# Patient Record
Sex: MALE | Race: WHITE | NOT HISPANIC OR LATINO | Employment: OTHER | URBAN - METROPOLITAN AREA
[De-identification: names, ages, dates, MRNs, and addresses within clinical notes are randomized per-mention and may not be internally consistent; named-entity substitution may affect disease eponyms.]

---

## 2017-01-19 ENCOUNTER — SLEEP CENTER VISIT (OUTPATIENT)
Dept: SLEEP MEDICINE | Facility: MEDICAL CENTER | Age: 62
End: 2017-01-19
Payer: COMMERCIAL

## 2017-01-19 VITALS
DIASTOLIC BLOOD PRESSURE: 80 MMHG | HEIGHT: 70 IN | HEART RATE: 95 BPM | WEIGHT: 175 LBS | TEMPERATURE: 97.9 F | OXYGEN SATURATION: 85 % | BODY MASS INDEX: 25.05 KG/M2 | SYSTOLIC BLOOD PRESSURE: 130 MMHG | RESPIRATION RATE: 16 BRPM

## 2017-01-19 DIAGNOSIS — J44.9 CHRONIC OBSTRUCTIVE PULMONARY DISEASE, UNSPECIFIED COPD TYPE (HCC): ICD-10-CM

## 2017-01-19 DIAGNOSIS — G47.31 CENTRAL SLEEP APNEA: ICD-10-CM

## 2017-01-19 PROCEDURE — 99213 OFFICE O/P EST LOW 20 MIN: CPT | Performed by: NURSE PRACTITIONER

## 2017-01-19 NOTE — MR AVS SNAPSHOT
"        Marcel Schultz   2017 3:20 PM   Sleep Center Visit   MRN: [id number]    Department:  [facility]   Dept Phone:  [phone]    Description:  Male : 1955   Provider:  LEONARD Osullivan           Reason for Visit     Apnea Compliance / Send to [facility]      Allergies as of 2017     Allergen Noted Reactions    Penicillins 2016         You were diagnosed with     Central sleep apnea   [411460]       Chronic obstructive pulmonary disease, unspecified COPD type (CMS-HCC)   [6087235]         Vital Signs     Blood Pressure Pulse Temperature Respirations Height Weight    130/80 mmHg 95 36.6 °C (97.9 °F) 16 1.778 m (5' 10\") 79.379 kg (175 lb)    Body Mass Index Oxygen Saturation Smoking Status             25.11 kg/m2 85% Former Smoker         Basic Information     Date Of Birth Sex Race Ethnicity Preferred Language    1955 Male White Non- English      Your appointments     2017  1:00 PM   Pulmonary Function Test with PFT-RM2   [facility] (--)    [address]  [address]  [place] [id number] [phone]            2017  2:00 PM   Established Patient Pul with YVONNE Davies   [facility] (--)    [address]  [address]  [place] [id number] [phone]            Mar 30, 2017  1:00 PM   Follow UP with Carmelo Davis M.D.   [facility] Sleep [facility] (--)    [address] [place] [address] A  [place] [id number] [phone]              Problem List              ICD-10-CM Priority Class Noted - Resolved    Central sleep apnea G47.31   2017 - Present    COPD (chronic obstructive pulmonary disease) (CMS-HCC) J44.9   2017 - Present      Health Maintenance        Date Due Completion Dates    IMM DTaP/Tdap/Td Vaccine (1 - Tdap) 2/15/1974 ---    COLONOSCOPY 2/15/2005 ---    IMM ZOSTER VACCINE 2/15/2015 ---    IMM INFLUENZA (1) 2016            Current Immunizations    "    Influenza TIV (IM) 1/1/2016    Pneumococcal polysaccharide vaccine (PPSV-23) 1/1/2016      Below and/or attached are the medications your provider expects you to take. Review all of your home medications and newly ordered medications with your provider and/or pharmacist. Follow medication instructions as directed by your provider and/or pharmacist. Please keep your medication list with you and share with your provider. Update the information when medications are discontinued, doses are changed, or new medications (including over-the-counter products) are added; and carry medication information at all times in the event of emergency situations     Allergies:  PENICILLINS - (reactions not documented)               Medications  Valid as of: January 19, 2017 -  3:54 PM    Generic Name Brand Name Tablet Size Instructions for use    Albuterol Sulfate (Aero Soln) albuterol 108 (90 BASE) MCG/ACT Inhale 2 Puffs by mouth every four hours as needed for Shortness of Breath.        Ascorbic Acid (Tab) Vitamin C 100 MG Take  by mouth.        Aspirin (Tab) aspirin 81 MG Take 81 mg by mouth every day.        Cholecalciferol (Tab) vitamin D3 (cholecalciferol) 1000 UNIT Take 1,000 Units by mouth every day.        Colesevelam HCl (Tab) WELCHOL 625 MG Take 1,875 mg by mouth 2 times a day, with meals.        Colestipol HCl (Tab) COLESTID 1 GM Take 2 g by mouth 2 Times a Day.        Doxepin HCl (Cap) SINEQUAN 150 MG Take 150 mg by mouth every evening.        Fenofibrate (Tab) TRIGLIDE 160 MG Take 160 mg by mouth every day.        FentaNYL (PATCH 72 HR) DURAGESIC 100 MCG/HR Apply 1 Patch to skin as directed every 72 hours.        FentaNYL (PATCH 72 HR) DURAGESIC 75 MCG/HR APPLY 1 PATCH EVERY 24 HOURS        Gabapentin (Cap) NEURONTIN 300 MG TAKE 1 ORAL CAPSULE 3 TIMES A DAY        Glucosamine HCl (Tab) Glucosamine HCl 1500 MG Take  by mouth.        Mirtazapine (Tab) REMERON 15 MG Take 15 mg by mouth every evening.        Multiple  Vitamin   Take  by mouth.        Omeprazole (CAPSULE DELAYED RELEASE) PRILOSEC 20 MG Take 20 mg by mouth every day.        PARoxetine HCl (Tab) PAXIL 40 MG Take 40 mg by mouth every day.        Polyethylene Glycol 3350   Take  by mouth.        Tiotropium Bromide Monohydrate (Cap) SPIRIVA 18 MCG Inhale 18 mcg by mouth every day.        Tiotropium Bromide Monohydrate (Aero Soln) SPIRIVA RESPIMAT 2.5 MCG/ACT TAKE 2 PUFFS BY MOUTH EVERY DAY        Zolpidem Tartrate (Tab) AMBIEN 5 MG Take 1 Tab by mouth at bedtime as needed for Sleep (1 to 3 po qhs prn insomnia/sleep study. Bring to sleep study.).        .                 Medicines prescribed today were sent to:     [facility]/PHARMACY #[id number] - [place], [place] - [address]    [address] [facility] [address]    Phone: [phone] Fax: [phone]    Open 24 Hours?: No    DME [facility]    [address] [place] [address]    Phone: [phone] Fax: [phone]      Medication refill instructions:       If your prescription bottle indicates you have medication refills left, it is not necessary to call your provider’s office. Please contact your pharmacy and they will refill your medication.    If your prescription bottle indicates you do not have any refills left, you may request refills at any time through one of the following ways: The online [employer] system (except Urgent Care), by calling your provider’s office, or by asking your pharmacy to contact your provider’s office with a refill request. Medication refills are processed only during regular business hours and may not be available until the next business day. Your provider may request additional information or to have a follow-up visit with you prior to refilling your medication.   *Please Note: Medication refills are assigned a new Rx number when refilled electronically. Your pharmacy may indicate that no refills were authorized even though a new prescription for the same medication is available at the  pharmacy. Please request the medicine by name with the pharmacy before contacting your provider for a refill.        Instructions    1. Continue Spiriva daily and albuterol HFA as needed.  2. Continue ASV 9/3/15 with 4 L of oxygen bleed in.  3. Keep oxygen saturations between 90-94 percent.               [employer] Access Code: Activation code not generated  Current [employer] Status: Active

## 2017-01-19 NOTE — PATIENT INSTRUCTIONS
1. Continue Spiriva daily and albuterol HFA as needed.  2. Continue ASV 9/3/15 with 4 L of oxygen bleed in.  3. Keep oxygen saturations between 90-94 percent.

## 2017-01-19 NOTE — PROGRESS NOTES
Chief Complaint   Patient presents with   • Apnea     Compliance / Send to [facility]:  This is a 61 y.o. male with a history of chronic obstructive pulmonary disease, abnormal CT scan, and central sleep apnea. Pulmonary function tests from 7/29/16 indicate FVC 91% predicted, FEV1 2.3 L, 65% predicted with positive bronchodilator response, QRW520/FVC 54%, FEF 25-75% 30% predicted, % predicted, DLCO 84% predicted. The patient is compliant with Spiriva once daily and Proaiir as needed. Also one antitrypsin level was normal at 14.8 µmol and phenotype with PiSZ. The patient is a history using oxygen at 4 L/m 24 7. He is 85% on room air. He reports he would rather die from a stroke or heart attack then use oxygen. Ultimately being off of his Duragesic patches and think he requires the 4 L he was previously using. He is going to continue monitoring oxygen keep saturations between 90-94 percent.    High resolution CT scan from 6/8/16 indicates some small areas of localized linear and ill-defined interstitial opacifications identified in the lateral left lower lobe, lateral left upper lobe, and posterior right lower lobe. These findings could indicate postinflammatory scarring or fibrosis area additionally bronchiectasis was noted bilaterally and there is no CT evidence of diffuse interstitial lung disease. Emphysema was identified most prominent in the upper lobes. Calcific atherosclerosis was also noted.    Polysomnogram indicates AHI 67.1 and minimum saturation 72%. The patient is compliant with ASV in 9/3/15 with 4 L of oxygen bleed in. Compliance dated 12/20-1/18/78 indicates 100% compliance for 8 hours 3 minutes. AHI is been reduced to 10.0. Patient feels he is more rested. He is benefiting from therapy. He will continue to benefit from therapy. Patient's AHI on histogram has improved tremendously over the last 10 days. In the last 10 days he has come off of 300 µg fentanyl patches and is now on a new  "medication which is helping with lowering oxygen needs and improving AHI.    Past Medical History   Diagnosis Date   • COPD (chronic obstructive pulmonary disease) (CMS-[facility])    • Asthma    • Depression    • Sleep apnea    • Central sleep apnea 1/19/2017     AHI 67.1, minimum saturation 72%, on ASV 9/30/15 with 4 L of oxygen bleed in.       Past Surgical History   Procedure Laterality Date   • Cholecystectomy     • Hernia repair         Social History   Substance Use Topics   • Smoking status: Former Smoker -- 1.50 packs/day for 40 years     Types: Cigarettes     Quit date: 01/01/2013   • Smokeless tobacco: Never Used      Comment: Vape    • Alcohol Use: No      Comment: have not drank in 13 yrs       ROS:   Constitutional: Denies fevers, chills, sweats, fatigue, and weight loss.  Eyes: Denies glasses.  Ears/nose/mouth/throat: Denies injury.  Cardiovascular: Denies chest pain, tightness.  Respiratory: See history of present illness.  GI: Denies heartburn, difficulty swallowing, nausea, and vomiting.  Neurological: Denies frequent headaches, dizziness, weakness.    Vitals:  Filed Vitals:    01/19/17 1528   Height: 1.778 m (5' 10\")   Weight: 79.379 kg (175 lb)   Weight % change since last entry.: 0 %   BP: 130/80   Pulse: 95   BMI (Calculated): 25.11   Resp: 16   Temp: 36.6 °C (97.9 °F)   O2 sat % room air: 85 %       Allergies:  Penicillins    Medications:  Current Outpatient Prescriptions   Medication Sig Dispense Refill   • fentanyl (DURAGESIC) 75 MCG/HR PATCH 72 HR APPLY 1 PATCH EVERY 24 HOURS  0   • gabapentin (NEURONTIN) 300 MG Cap TAKE 1 ORAL CAPSULE 3 TIMES A DAY  2   • SPIRIVA RESPIMAT 2.5 MCG/ACT Aero Soln TAKE 2 PUFFS BY MOUTH EVERY DAY  0   • colestipol (COLESTID) 1 GM Tab Take 2 g by mouth 2 Times a Day.  5   • albuterol 108 (90 BASE) MCG/ACT Aero Soln inhalation aerosol Inhale 2 Puffs by mouth every four hours as needed for Shortness of Breath.     • zolpidem (AMBIEN) 5 MG Tab Take 1 Tab by mouth at " bedtime as needed for Sleep (1 to 3 po qhs prn insomnia/sleep study. Bring to sleep study.). 3 Tab 0   • fenofibrate (TRIGLIDE) 160 MG tablet Take 160 mg by mouth every day.     • paroxetine (PAXIL) 40 MG tablet Take 40 mg by mouth every day.     • tiotropium (SPIRIVA) 18 MCG Cap Inhale 18 mcg by mouth every day.     • colesevelam (WELCHOL) 625 MG Tab Take 1,875 mg by mouth 2 times a day, with meals.     • fentanyl (DURAGESIC) 100 MCG/HR PATCH 72 HR Apply 1 Patch to skin as directed every 72 hours.     • omeprazole (PRILOSEC) 20 MG delayed-release capsule Take 20 mg by mouth every day.     • vitamin D3, cholecalciferol, 1000 UNIT Tab Take 1,000 Units by mouth every day.     • Multiple Vitamin (MULTI VITAMIN MENS PO) Take  by mouth.     • Ascorbic Acid (VITAMIN C) 100 MG Tab Take  by mouth.     • Glucosamine HCl 1500 MG Tab Take  by mouth.     • POLYETHYLENE GLYCOL 3350 PO Take  by mouth.     • aspirin 81 MG tablet Take 81 mg by mouth every day.     • doxepin (SINEQUAN) 150 MG capsule Take 150 mg by mouth every evening.     • mirtazapine (REMERON) 15 MG Tab Take 15 mg by mouth every evening.       No current facility-administered medications for this visit.       PHYSICAL EXAM:  Appearance: Well-developed, well-nourished, no acute distress.  Eyes. PERRL.  Hearing: Grossly intact.  Oropharynx: Tongue normal, posterior pharynx without erythema or exudate.  Respiratory effort: No intercostal retractions or use of accessory muscles.  Lung auscultation: No crackles, wheezing.  Heart auscultation: No murmur, gallop, or rub. Regular rate and rhythm.  Extremities: No cyanosis or edema.  Gait and Station: Normal  Orientation: Oriented to time, place, and person.    Assessment:  1. Central sleep apnea     2. Chronic obstructive pulmonary disease, unspecified COPD type (CMS-HCC)           Plan:  1. Continue Spiriva daily and albuterol HFA as needed.  2. Continue ASV 9/3/15 with 4 L of oxygen bleed in.  3. Keep oxygen  saturations between 90-94 percent.  4. Will not change ASV pressures at this time as AHI is improved tremendously on histogram. We'll evaluate his next visit.  Patient is already scheduled for follow-up with pulmonary function tests. Patient will require CT in the future per Dr. Booth.

## 2017-02-01 ENCOUNTER — OFFICE VISIT (OUTPATIENT)
Dept: PULMONOLOGY | Facility: HOSPICE | Age: 62
End: 2017-02-01
Payer: COMMERCIAL

## 2017-02-01 ENCOUNTER — NON-PROVIDER VISIT (OUTPATIENT)
Dept: PULMONOLOGY | Facility: HOSPICE | Age: 62
End: 2017-02-01
Payer: COMMERCIAL

## 2017-02-01 VITALS
BODY MASS INDEX: 25.77 KG/M2 | TEMPERATURE: 99.3 F | OXYGEN SATURATION: 91 % | HEART RATE: 82 BPM | SYSTOLIC BLOOD PRESSURE: 132 MMHG | HEIGHT: 70 IN | RESPIRATION RATE: 16 BRPM | WEIGHT: 180 LBS | DIASTOLIC BLOOD PRESSURE: 86 MMHG

## 2017-02-01 DIAGNOSIS — J43.9 PULMONARY EMPHYSEMA, UNSPECIFIED EMPHYSEMA TYPE (HCC): ICD-10-CM

## 2017-02-01 DIAGNOSIS — J44.9 CHRONIC OBSTRUCTIVE PULMONARY DISEASE, UNSPECIFIED COPD TYPE (HCC): ICD-10-CM

## 2017-02-01 DIAGNOSIS — G47.31 CENTRAL SLEEP APNEA: ICD-10-CM

## 2017-02-01 PROCEDURE — 90670 PCV13 VACCINE IM: CPT | Performed by: NURSE PRACTITIONER

## 2017-02-01 PROCEDURE — 99214 OFFICE O/P EST MOD 30 MIN: CPT | Mod: 25 | Performed by: NURSE PRACTITIONER

## 2017-02-01 PROCEDURE — 90471 IMMUNIZATION ADMIN: CPT | Performed by: NURSE PRACTITIONER

## 2017-02-01 RX ORDER — CHLORHEXIDINE GLUCONATE ORAL RINSE 1.2 MG/ML
SOLUTION DENTAL
Refills: 0 | COMMUNITY
Start: 2017-01-20 | End: 2017-08-09

## 2017-02-01 RX ORDER — CLINDAMYCIN HYDROCHLORIDE 300 MG/1
CAPSULE ORAL
Refills: 0 | COMMUNITY
Start: 2017-01-20 | End: 2017-08-09

## 2017-02-01 RX ORDER — GABAPENTIN 600 MG/1
600 TABLET ORAL
Refills: 0 | COMMUNITY
Start: 2017-01-24 | End: 2017-08-09

## 2017-02-01 RX ORDER — BUPRENORPHINE AND NALOXONE 2; .5 MG/1; MG/1
FILM, SOLUBLE BUCCAL; SUBLINGUAL
COMMUNITY

## 2017-02-01 ASSESSMENT — PULMONARY FUNCTION TESTS
FEV1_PERCENT_CHANGE: 5
FVC_PERCENT_PREDICTED: 104
FEV1: 2.43
FEV1/FVC_PERCENT_PREDICTED: 70
FEV1_PERCENT_CHANGE: 11
FVC_PERCENT_PREDICTED: 94
FVC: 4.34
FEV1/FVC: 53
FEV1_PERCENT_PREDICTED: 66
FEV1_PERCENT_PREDICTED: 69
FVC_PREDICTED: 4.62
FEV1/FVC: 50.1
FEV1_PREDICTED: 3.48
FVC: 4.85
FEV1: 2.31
FEV1/FVC_PERCENT_CHANGE: 45
FEV1/FVC_PERCENT_PREDICTED: 66
FEV1/FVC_PERCENT_PREDICTED: 75

## 2017-02-01 NOTE — PATIENT INSTRUCTIONS
1) Continue ASV machine 9/3/15 with 4L   2) Continue cleaning supplies weekly and change them as insurance allows.   3) Repeat PFT and Cat scan in 6 months - Dec 2016 - will reconsider alpha 1 treatment if abrupt changes in PFT and symptoms.  4) Vaccines: Up to date with Pneumococcal 23 in 2016. Prevnar 13 and flu today  5) Continue Spiriva and Rescue inhaler  6) Continueusing oxygen 24/7 at 4L  7) Return in about 6 months (around 8/1/2017) for pulmonary function results, review of symptoms, if not sooner, follow up with GRADY Castillo, CAT scan results.

## 2017-02-01 NOTE — PROGRESS NOTES
CC:  Here for f/u sleep and pulmonary issues as listed below    HPI:   Mr. Schultz returns today for DEREJE, COPD. He was previously treated by Dr. Chino with Servo adaptive BiPAP ventilation along with oxygen at 2 L/m, but was not able to tolerate the BIPAP. A repeat sleep study performed found severe DEREJE with an AHI of 67.1, a low saturation of 72.0%. 79.5 percent of the events were central apneas. The patient then underwent a CPAP and BiPAP titration both of which were ineffective secondary to the persistence of predominantly central events. Thus a dedicated titration for ASV study was completed and successful with recommendations of a ASV machine of 9/3/15 with 4 L of oxygen. Compliance is unavailable today but reports he wears the machine every single night for approximately 7 hours. He left his machine. He sleeps better overall. He does not have nightmares anymore. He wakes up refreshed without morning headache.  less tired throughout the day.      Patient smoked 2 packages of cigarettes a day for 30 years and quit cigarettes in 2012.  He is on Spiriva once a day and has an albuterol metered-dose inhaler that he uses rarely. He reports his breathing is stable. Denies cough, dyspnea, wheeze, chest pain, fevers and chills, hemoptysis, acid reflux. Alpha-1 antitrypsin serum concentration of 14.8 µmol.  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 Patient's main concern was starting alpha 1 treatment, given he is borderline. At previous office visit Dr. Peñaloza discussed with patient that given his current respiratory status, evidence dose not prove beneficial at this time. We would reconsider if the PFTs becomes significantly worse.  Patient was with understanding. PFTs from 2/2017 are stable and improved from 7/2016 and showed an FEV1 of 2.31 L or 66% with a positive bronchodilator response, Fev1/FVC ratio of 53. The DLCO has dropped to 72 from 84%. We will repeat these in another 6 months.       In addition patient had a CT of his  "chest on 6/8/2016 which was a high-resolution CT scan without contrast. That showed \"some small areas of localized linear and ill-defined interstitial opacifications identified in the lateral left lower lobe, lateral left upper lobe, and posterior right lower lobe. These findings could indicate postinflammatory scarring or fibrosis area additionally bronchiectasis was noted bilaterally and there is no CT evidence of diffuse interstitial lung disease. Emphysema was identified most prominent in the upper lobes. Calcific atherosclerosis was also noted. Patient did not complete his updated CAT scan but understands he needs completed.        Patient Active Problem List    Diagnosis Date Noted   • Central sleep apnea 01/19/2017   • COPD (chronic obstructive pulmonary disease) (CMS-HCC) 01/19/2017       Past Medical History   Diagnosis Date   • COPD (chronic obstructive pulmonary disease) (CMS-HCC)    • Asthma    • Depression    • Sleep apnea    • Central sleep apnea 1/19/2017     AHI 67.1, minimum saturation 72%, on ASV 9/30/15 with 4 L of oxygen bleed in.       Past Surgical History   Procedure Laterality Date   • Cholecystectomy     • Hernia repair         Family History   Problem Relation Age of Onset   • Alzheimer's Disease Father        Social History     Social History   • Marital Status: Single     Spouse Name: N/A   • Number of Children: N/A   • Years of Education: N/A     Occupational History   • Not on file.     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  0   • Buprenorphine HCl-Naloxone HCl (SUBOXONE) 2-0.5 MG FILM Place  under tongue.     • gabapentin (NEURONTIN) 300 MG Cap TAKE 1 ORAL CAPSULE 3 TIMES A DAY  2   • SPIRIVA RESPIMAT 2.5 MCG/ACT Aero Soln TAKE 2 PUFFS BY MOUTH EVERY DAY  0   • colestipol (COLESTID) 1 GM Tab Take 2 g by mouth 2 Times a Day.  5   • albuterol 108 (90 BASE) MCG/ACT Aero Soln inhalation aerosol Inhale 2 Puffs by mouth every four hours as needed for Shortness of Breath.     • fenofibrate (TRIGLIDE) 160 MG tablet Take 160 mg by mouth every day.     • paroxetine (PAXIL) 40 MG tablet Take 40 mg by mouth every day.     • colesevelam (WELCHOL) 625 MG Tab Take 1,875 mg by mouth 2 times a day, with meals.     • vitamin D3, cholecalciferol, 1000 UNIT Tab Take 1,000 Units by mouth every day.     • Multiple Vitamin (MULTI VITAMIN MENS PO) Take  by mouth.     • Ascorbic Acid (VITAMIN C) 100 MG Tab Take  by mouth.     • Glucosamine HCl 1500 MG Tab Take  by mouth.     • POLYETHYLENE GLYCOL 3350 PO Take  by mouth.     • aspirin 81 MG tablet Take 81 mg by mouth every day.     • doxepin (SINEQUAN) 150 MG capsule Take 150 mg by mouth every evening.     • mirtazapine (REMERON) 15 MG Tab Take 15 mg by mouth every evening.     • clindamycin (CLEOCIN) 300 MG Cap TAKE 1 CAPSULE BY MOUTH EVERY 6 HOURS FOR 7 DAYS  0   • fentanyl (DURAGESIC) 75 MCG/HR PATCH 72 HR APPLY 1 PATCH EVERY 24 HOURS  0   • tiotropium (SPIRIVA) 18 MCG Cap Inhale 18 mcg by mouth every day.     • fentanyl (DURAGESIC) 100 MCG/HR PATCH 72 HR Apply 1 Patch to skin as directed every 72 hours.     • omeprazole (PRILOSEC) 20 MG delayed-release capsule Take 20 mg by mouth every day.       No current facility-administered medications for this visit.          Allergies: Penicillins      ROS   Gen: Denies fever, chills, unintentional weight loss, fatigue, night  "sweats  E/N/T: Denies ear pain, nasal congestion  Resp:Denies Dyspnea, wheezing, cough, sputum production, hemoptysis  CV: Denies chest pain, chest tightness, palpitations, BLE edema  Sleep:Denies morning headache, insomnia, daytime somnolence, snoring, gasping for air, apnea  Neuro: Denies frequent headaches, weakness, dizziness  GI: Denies N/V, acid reflux/heartburn  See HPI.  All other systems reviewed and negative      Vital signs for this encounter:  Filed Vitals:    02/01/17 1403   Height: 1.778 m (5' 10\")   Weight: 81.647 kg (180 lb)   Weight % change since last entry.: 0 %   BP: 132/86   Pulse: 82   BMI (Calculated): 25.83   Resp: 16   Temp: 37.4 °C (99.3 °F)                 Physical Exam:   Appearance: well developed, well nourished, no acute distress.  Eyes: PERRL, EOM intact, sclere white, conjunctiva moist.  Ears: no lesions or deformities.  Hearing: grossly intact.  Nose: no lesions or deformities.  Dentition: good dentition.  Oropharynx: tongue normal, posterior pharynx without erythema or exudate.  Neck: supple, trachea midline, no masses.  Respiratory effort: no intercostal retractions or use of accessory muscles.  Lung auscultation: Bilateral diminished   Heart auscultation: no murmur, rub, or gallop.   Extremities: no cyanosis or edema.  Abdomen: soft, non-tender, no masses.  Gait and station: grossly normal   Digits and Nails: no clubbing, cyanosis, petechiae, or nodes.  Cranial nerves: grossly normal.  Motor: no focal deficits observed.  Skin: no rashes, lesions, or ulcers noted.  Orientation: oriented to time, place, and person.  Mood and affect: mood and affect appropriate, normal interaction with examiner.    Assessment   1. Chronic obstructive pulmonary disease, unspecified COPD type (CMS-HCC)  PNEUMOCOCCAL CONJUGATE VACCINE 13-VALENT    INFLUENZA VACCINE QUAD INJECTION >3Y(PRESERVED)    AMB PULMONARY FUNCTION TEST/LAB   2. Central sleep apnea         PLAN:   Patient Instructions   1) " Continue ASV machine 9/3/15 with 4L   2) Continue cleaning supplies weekly and change them as insurance allows.   3) Repeat PFT and Cat scan in 6 months - Dec 2016 - will reconsider alpha 1 treatment if abrupt changes in PFT and symptoms.  4) Vaccines: Up to date with Pneumococcal 23 in 2016. Prevnar 13 and flu today  5) Continue Spiriva and Rescue inhaler  6) Continueusing oxygen 24/7 at 4L  7) No Follow-up on file.  Return in about 6 months (around 8/1/2017) for pulmonary function results, review of symptoms, if not sooner, follow up with GRADY Castillo, CAT scan results.

## 2017-02-01 NOTE — MR AVS SNAPSHOT
Marcel Schultz   2017 1:00 PM   Appointment   MRN: [id number]    Department:  [facility]   Dept Phone:  [phone]    Description:  Male : 1955   Provider:  PFT-RMRonald           Allergies as of 2017     Allergen Noted Reactions    Penicillins 2016         Vital Signs     Smoking Status                   Former Smoker           Basic Information     Date Of Birth Sex Race Ethnicity Preferred Language    1955 Male White Non- English      Your appointments     Mar 30, 2017  1:00 PM   Follow UP with Carmelo Davis M.D.   [facility] Sleep Medicine (--)    [address]  [place] [id number] [phone]              Problem List              ICD-10-CM Priority Class Noted - Resolved    Central sleep apnea G47.31   2017 - Present    COPD (chronic obstructive pulmonary disease) (CMS-HCC) J44.9   2017 - Present      Health Maintenance        Date Due Completion Dates    IMM DTaP/Tdap/Td Vaccine (1 - Tdap) 2/15/1974 ---    COLONOSCOPY 2/15/2005 ---    IMM ZOSTER VACCINE 2/15/2015 ---    IMM INFLUENZA (1) 2016            Current Immunizations     Influenza TIV (IM) 2016    Pneumococcal polysaccharide vaccine (PPSV-23) 2016      Below and/or attached are the medications your provider expects you to take. Review all of your home medications and newly ordered medications with your provider and/or pharmacist. Follow medication instructions as directed by your provider and/or pharmacist. Please keep your medication list with you and share with your provider. Update the information when medications are discontinued, doses are changed, or new medications (including over-the-counter products) are added; and carry medication information at all times in the event of emergency situations     Allergies:  PENICILLINS - (reactions not documented)               Medications  Valid as of: 2017 -  2:11 PM    Generic Name  Brand Name Tablet Size Instructions for use    Albuterol Sulfate (Aero Soln) albuterol 108 (90 BASE) MCG/ACT Inhale 2 Puffs by mouth every four hours as needed for Shortness of Breath.        Ascorbic Acid (Tab) Vitamin C 100 MG Take  by mouth.        Aspirin (Tab) aspirin 81 MG Take 81 mg by mouth every day.        Buprenorphine HCl-Naloxone HCl (FILM) Buprenorphine HCl-Naloxone HCl 2-0.5 MG Place  under tongue.        Chlorhexidine Gluconate (Solution) PERIDEX 0.12 % SWISH AND SPIT BY MOUTH FOR 60 SECONDS 3 TIMES DAILY FOR 2 WEEKS        Cholecalciferol (Tab) vitamin D3 (cholecalciferol) 1000 UNIT Take 1,000 Units by mouth every day.        Clindamycin HCl (Cap) CLEOCIN 300 MG TAKE 1 CAPSULE BY MOUTH EVERY 6 HOURS FOR 7 DAYS        Colesevelam HCl (Tab) WELCHOL 625 MG Take 1,875 mg by mouth 2 times a day, with meals.        Colestipol HCl (Tab) COLESTID 1 GM Take 2 g by mouth 2 Times a Day.        Doxepin HCl (Cap) SINEQUAN 150 MG Take 150 mg by mouth every evening.        Fenofibrate (Tab) TRIGLIDE 160 MG Take 160 mg by mouth every day.        FentaNYL (PATCH 72 HR) DURAGESIC 100 MCG/HR Apply 1 Patch to skin as directed every 72 hours.        FentaNYL (PATCH 72 HR) DURAGESIC 75 MCG/HR APPLY 1 PATCH EVERY 24 HOURS        Gabapentin (Cap) NEURONTIN 300 MG TAKE 1 ORAL CAPSULE 3 TIMES A DAY        Gabapentin (Tab) NEURONTIN 600 MG Take 600 mg by mouth.        Glucosamine HCl (Tab) Glucosamine HCl 1500 MG Take  by mouth.        Mirtazapine (Tab) REMERON 15 MG Take 15 mg by mouth every evening.        Multiple Vitamin   Take  by mouth.        Omeprazole (CAPSULE DELAYED RELEASE) PRILOSEC 20 MG Take 20 mg by mouth every day.        PARoxetine HCl (Tab) PAXIL 40 MG Take 40 mg by mouth every day.        Polyethylene Glycol 3350   Take  by mouth.        Tiotropium Bromide Monohydrate (Cap) SPIRIVA 18 MCG Inhale 18 mcg by mouth every day.        Tiotropium Bromide Monohydrate (Aero Soln) SPIRIVA RESPIMAT 2.5 MCG/ACT TAKE 2  PUFFS BY MOUTH EVERY DAY        Zolpidem Tartrate (Tab) AMBIEN 5 MG Take 1 Tab by mouth at bedtime as needed for Sleep (1 to 3 po qhs prn insomnia/sleep study. Bring to sleep study.).        .                 Medicines prescribed today were sent to:     [facility]/PHARMACY #[id number] - [place], [place] - [address]    [address] [facility] [address]    Phone: [phone] Fax: [phone]    Open 24 Hours?: No    [facility]    [address] [address]    Phone: [phone] Fax: [phone]      Medication refill instructions:       If your prescription bottle indicates you have medication refills left, it is not necessary to call your provider’s office. Please contact your pharmacy and they will refill your medication.    If your prescription bottle indicates you do not have any refills left, you may request refills at any time through one of the following ways: The online [employer] system (except Urgent Care), by calling your provider’s office, or by asking your pharmacy to contact your provider’s office with a refill request. Medication refills are processed only during regular business hours and may not be available until the next business day. Your provider may request additional information or to have a follow-up visit with you prior to refilling your medication.   *Please Note: Medication refills are assigned a new Rx number when refilled electronically. Your pharmacy may indicate that no refills were authorized even though a new prescription for the same medication is available at the pharmacy. Please request the medicine by name with the pharmacy before contacting your provider for a refill.           [employer] Access Code: Activation code not generated  Current [employer] Status: Active

## 2017-02-01 NOTE — MR AVS SNAPSHOT
"        Marcel Schultz   2017 2:00 PM   Office Visit   MRN: [id number]    Department:  [facility]   Dept Phone:  [phone]    Description:  Male : 1955   Provider:  YVONNE Davies           Reason for Visit     Follow-Up PFT      Allergies as of 2017     Allergen Noted Reactions    Penicillins 2016         You were diagnosed with     Chronic obstructive pulmonary disease, unspecified COPD type (CMS-HCC)   [[id number]]         Vital Signs     Blood Pressure Pulse Temperature Respirations Height Weight    132/86 mmHg 82 37.4 °C (99.3 °F) 16 1.778 m (5' 10\") 81.647 kg (180 lb)    Body Mass Index Oxygen Saturation Smoking Status             25.83 kg/m2 91% Former Smoker         Basic Information     Date Of Birth Sex Race Ethnicity Preferred Language    1955 Male White Non- English      Your appointments     Mar 30, 2017  1:00 PM   Follow UP with Carmelo Davis M.D.   [facility] Sleep Medicine (--)    [address]  [place] [id number] [phone]            2017  2:00 PM   Pulmonary Function Test with PFT-2   [facility] Pulmonary Medicine (--)    [address]  [place] [id number] [phone]            2017  3:20 PM   Established Patient Pul with YVONNE Davies   [facility] Pulmonary Medicine (--)    [address]  [address]  [place] [id number] [phone]              Problem List              ICD-10-CM Priority Class Noted - Resolved    Central sleep apnea G47.31   2017 - Present    COPD (chronic obstructive pulmonary disease) (CMS-HCC) J44.9   2017 - Present      Health Maintenance        Date Due Completion Dates    IMM DTaP/Tdap/Td Vaccine (1 - Tdap) 2/15/1974 ---    COLONOSCOPY 2/15/2005 ---    IMM ZOSTER VACCINE 2/15/2015 ---    IMM INFLUENZA (1) 2016            Current Immunizations     13-VALENT PCV PREVNAR  Incomplete    Influenza TIV (IM) " 1/1/2016    Influenza Vaccine Quad Inj (Preserved)  Incomplete    Pneumococcal polysaccharide vaccine (PPSV-23) 1/1/2016      Below and/or attached are the medications your provider expects you to take. Review all of your home medications and newly ordered medications with your provider and/or pharmacist. Follow medication instructions as directed by your provider and/or pharmacist. Please keep your medication list with you and share with your provider. Update the information when medications are discontinued, doses are changed, or new medications (including over-the-counter products) are added; and carry medication information at all times in the event of emergency situations     Allergies:  PENICILLINS - (reactions not documented)               Medications  Valid as of: February 01, 2017 -  3:03 PM    Generic Name Brand Name Tablet Size Instructions for use    Albuterol Sulfate (Aero Soln) albuterol 108 (90 BASE) MCG/ACT Inhale 2 Puffs by mouth every four hours as needed for Shortness of Breath.        Ascorbic Acid (Tab) Vitamin C 100 MG Take  by mouth.        Aspirin (Tab) aspirin 81 MG Take 81 mg by mouth every day.        Buprenorphine HCl-Naloxone HCl (FILM) Buprenorphine HCl-Naloxone HCl 2-0.5 MG Place  under tongue.        Chlorhexidine Gluconate (Solution) PERIDEX 0.12 % SWISH AND SPIT BY MOUTH FOR 60 SECONDS 3 TIMES DAILY FOR 2 WEEKS        Cholecalciferol (Tab) vitamin D3 (cholecalciferol) 1000 UNIT Take 1,000 Units by mouth every day.        Clindamycin HCl (Cap) CLEOCIN 300 MG TAKE 1 CAPSULE BY MOUTH EVERY 6 HOURS FOR 7 DAYS        Colesevelam HCl (Tab) WELCHOL 625 MG Take 1,875 mg by mouth 2 times a day, with meals.        Colestipol HCl (Tab) COLESTID 1 GM Take 2 g by mouth 2 Times a Day.        Doxepin HCl (Cap) SINEQUAN 150 MG Take 150 mg by mouth every evening.        Fenofibrate (Tab) TRIGLIDE 160 MG Take 160 mg by mouth every day.        FentaNYL (PATCH 72 HR) DURAGESIC 100 MCG/HR Apply 1 Patch  to skin as directed every 72 hours.        FentaNYL (PATCH 72 HR) DURAGESIC 75 MCG/HR APPLY 1 PATCH EVERY 24 HOURS        Gabapentin (Cap) NEURONTIN 300 MG TAKE 1 ORAL CAPSULE 3 TIMES A DAY        Gabapentin (Tab) NEURONTIN 600 MG Take 600 mg by mouth.        Glucosamine HCl (Tab) Glucosamine HCl 1500 MG Take  by mouth.        Mirtazapine (Tab) REMERON 15 MG Take 15 mg by mouth every evening.        Multiple Vitamin   Take  by mouth.        Omeprazole (CAPSULE DELAYED RELEASE) PRILOSEC 20 MG Take 20 mg by mouth every day.        PARoxetine HCl (Tab) PAXIL 40 MG Take 40 mg by mouth every day.        Polyethylene Glycol 3350   Take  by mouth.        Tiotropium Bromide Monohydrate (Cap) SPIRIVA 18 MCG Inhale 18 mcg by mouth every day.        Tiotropium Bromide Monohydrate (Aero Soln) SPIRIVA RESPIMAT 2.5 MCG/ACT TAKE 2 PUFFS BY MOUTH EVERY DAY        Zolpidem Tartrate (Tab) AMBIEN 5 MG Take 1 Tab by mouth at bedtime as needed for Sleep (1 to 3 po qhs prn insomnia/sleep study. Bring to sleep study.).        .                 Medicines prescribed today were sent to:     [facility]/PHARMACY #[id number] - [place], [place] - [address]    [address] [facility] [address]    Phone: [phone] Fax: [phone]    Open 24 Hours?: No    DME [facility]    [address] [address]    Phone: [phone] Fax: [phone]      Medication refill instructions:       If your prescription bottle indicates you have medication refills left, it is not necessary to call your provider’s office. Please contact your pharmacy and they will refill your medication.    If your prescription bottle indicates you do not have any refills left, you may request refills at any time through one of the following ways: The online [employer] system (except Urgent Care), by calling your provider’s office, or by asking your pharmacy to contact your provider’s office with a refill request. Medication refills are processed only during regular business hours  and may not be available until the next business day. Your provider may request additional information or to have a follow-up visit with you prior to refilling your medication.   *Please Note: Medication refills are assigned a new Rx number when refilled electronically. Your pharmacy may indicate that no refills were authorized even though a new prescription for the same medication is available at the pharmacy. Please request the medicine by name with the pharmacy before contacting your provider for a refill.        Your To Do List     Future Labs/Procedures Complete By Expires    AMB PULMONARY FUNCTION TEST/LAB  As directed 2/1/2018      Instructions    1) Continue ASV machine 9/3/15 with 4L   2) Continue cleaning supplies weekly and change them as insurance allows.   3) Repeat PFT and Cat scan in 6 months - Dec 2016 - will reconsider alpha 1 treatment if abrupt changes in PFT and symptoms.  4) Vaccines: Up to date with Pneumococcal 23 in 2016. Prevnar 13 and flu today  5) Continue Spiriva and Rescue inhaler  6) Continueusing oxygen 24/7 at 4L  7) No Follow-up on file.              [employer] Access Code: Activation code not generated  Current [employer] Status: Active

## 2017-02-02 NOTE — PROCEDURES
Technician: Kim Reyes, RRT/CPFT  Technician Comments:  Good patient effort & cooperation.  The results of this test meet the ATS/ERS standards for acceptability and repeatability.  Test was performed on the [employer] Body Plethysmograph- Elite DX system.  Predicted equations for Spirometry are NHanes, DLCO- [facility].  The DLCO was uncorrected for Hgb.  A bronchodilator of Ventolin HFA- 2puffs via spacer were administered.    SPIROMETRY:  1. FVC was 4.34 L, 94% of predicted  2. FEV1 was 2.31 L, 66% of predicted  3. FEV1/FVC ratio was 50%  4. There was no significant response to bronchodilators  5. Flow volume loop was scooped consistent with a rate obstruction  LUNG VOLUMES:  1. Residual volume was 3.04 L, 134% of predicted  2. Total lung capacity was 7.94 L, 115% of predicted    DIFFUSION CAPACITY:  1. Diffusion capacity was mildly decreased at 72% predicted      IMPRESSION:  The patient has moderate airway obstruction consistent with moderate COPD. Clinical correlation is required.

## 2017-02-06 ENCOUNTER — TELEPHONE (OUTPATIENT)
Dept: PULMONOLOGY | Facility: HOSPICE | Age: 62
End: 2017-02-06

## 2017-02-06 NOTE — TELEPHONE ENCOUNTER
"1. Caller Name: Marcel                      Call Back Number: [phone] (home)     2. Message: Patient calling stating that medicaid sent him a letter stating that they are not going to continue to pay for his POC because of a statement or \"report\" we sent them stating he wasn't compliant. I called and spoke to [facility] and they don't have any documentation from us, last seen was ASV order from December. Gentlemen I talked to from [employer] suggested we send his last OV to them and they could try and figure something out and contact the patient.   Faxed last OV     3. Patient approves office to leave a detailed voicemail/MyChart message: yes    "

## 2017-02-08 PROCEDURE — 90471 IMMUNIZATION ADMIN: CPT | Performed by: NURSE PRACTITIONER

## 2017-02-08 PROCEDURE — 90688 IIV4 VACCINE SPLT 0.5 ML IM: CPT | Performed by: NURSE PRACTITIONER

## 2017-02-08 NOTE — TELEPHONE ENCOUNTER
Patient called our office, I called patient back and spoke to him, informed him to call [employer] and see what the status is.

## 2017-02-14 ENCOUNTER — HOSPITAL ENCOUNTER (OUTPATIENT)
Dept: RADIOLOGY | Facility: MEDICAL CENTER | Age: 62
End: 2017-02-14
Attending: NURSE PRACTITIONER
Payer: COMMERCIAL

## 2017-02-14 DIAGNOSIS — G47.33 OSA (OBSTRUCTIVE SLEEP APNEA): ICD-10-CM

## 2017-02-14 DIAGNOSIS — J43.9 PULMONARY EMPHYSEMA, UNSPECIFIED EMPHYSEMA TYPE (HCC): ICD-10-CM

## 2017-02-14 PROCEDURE — 71250 CT THORAX DX C-: CPT

## 2017-07-31 ENCOUNTER — APPOINTMENT (OUTPATIENT)
Dept: PULMONOLOGY | Facility: HOSPICE | Age: 62
End: 2017-07-31
Payer: COMMERCIAL

## 2017-08-09 ENCOUNTER — HOSPITAL ENCOUNTER (OUTPATIENT)
Facility: MEDICAL CENTER | Age: 62
End: 2017-08-09
Attending: FAMILY MEDICINE
Payer: COMMERCIAL

## 2017-08-09 ENCOUNTER — HOSPITAL ENCOUNTER (OUTPATIENT)
Dept: RADIOLOGY | Facility: MEDICAL CENTER | Age: 62
End: 2017-08-09
Attending: FAMILY MEDICINE
Payer: COMMERCIAL

## 2017-08-09 ENCOUNTER — OFFICE VISIT (OUTPATIENT)
Dept: URGENT CARE | Facility: PHYSICIAN GROUP | Age: 62
End: 2017-08-09
Payer: COMMERCIAL

## 2017-08-09 VITALS
BODY MASS INDEX: 26.6 KG/M2 | OXYGEN SATURATION: 91 % | DIASTOLIC BLOOD PRESSURE: 88 MMHG | HEART RATE: 70 BPM | RESPIRATION RATE: 18 BRPM | HEIGHT: 71 IN | SYSTOLIC BLOOD PRESSURE: 124 MMHG | WEIGHT: 190 LBS | TEMPERATURE: 98.2 F

## 2017-08-09 DIAGNOSIS — M79.89 LEG SWELLING: ICD-10-CM

## 2017-08-09 DIAGNOSIS — I82.412 DVT OF DEEP FEMORAL VEIN, LEFT (HCC): Primary | ICD-10-CM

## 2017-08-09 DIAGNOSIS — I82.412 DVT OF DEEP FEMORAL VEIN, LEFT (HCC): ICD-10-CM

## 2017-08-09 LAB
ALBUMIN SERPL BCP-MCNC: 4.3 G/DL (ref 3.2–4.9)
ALBUMIN/GLOB SERPL: 1.5 G/DL
ALP SERPL-CCNC: 35 U/L (ref 30–99)
ALT SERPL-CCNC: 26 U/L (ref 2–50)
ANION GAP SERPL CALC-SCNC: 4 MMOL/L (ref 0–11.9)
AST SERPL-CCNC: 37 U/L (ref 12–45)
BILIRUB SERPL-MCNC: 0.5 MG/DL (ref 0.1–1.5)
BUN SERPL-MCNC: 9 MG/DL (ref 8–22)
CALCIUM SERPL-MCNC: 9.7 MG/DL (ref 8.5–10.5)
CHLORIDE SERPL-SCNC: 104 MMOL/L (ref 96–112)
CO2 SERPL-SCNC: 29 MMOL/L (ref 20–33)
CREAT SERPL-MCNC: 1.23 MG/DL (ref 0.5–1.4)
GFR SERPL CREATININE-BSD FRML MDRD: 60 ML/MIN/1.73 M 2
GLOBULIN SER CALC-MCNC: 2.9 G/DL (ref 1.9–3.5)
GLUCOSE SERPL-MCNC: 84 MG/DL (ref 65–99)
POTASSIUM SERPL-SCNC: 5 MMOL/L (ref 3.6–5.5)
PROT SERPL-MCNC: 7.2 G/DL (ref 6–8.2)
SODIUM SERPL-SCNC: 137 MMOL/L (ref 135–145)

## 2017-08-09 PROCEDURE — 36415 COLL VENOUS BLD VENIPUNCTURE: CPT | Performed by: FAMILY MEDICINE

## 2017-08-09 PROCEDURE — 99202 OFFICE O/P NEW SF 15 MIN: CPT | Performed by: FAMILY MEDICINE

## 2017-08-09 PROCEDURE — 80053 COMPREHEN METABOLIC PANEL: CPT

## 2017-08-09 PROCEDURE — 93971 EXTREMITY STUDY: CPT | Mod: LT

## 2017-08-09 RX ORDER — MAGNESIUM GLUCONATE 27 MG(500)
500 TABLET ORAL 3 TIMES DAILY
COMMUNITY
End: 2017-08-09

## 2017-08-09 RX ORDER — CLINDAMYCIN HYDROCHLORIDE 300 MG/1
300 CAPSULE ORAL 3 TIMES DAILY
Qty: 21 CAP | Refills: 0 | Status: SHIPPED | OUTPATIENT
Start: 2017-08-09 | End: 2017-08-16

## 2017-08-09 ASSESSMENT — ENCOUNTER SYMPTOMS
FOCAL WEAKNESS: 0
FEVER: 0
SHORTNESS OF BREATH: 0
CHILLS: 0
MYALGIAS: 1
DIZZINESS: 0

## 2017-08-09 NOTE — PROGRESS NOTES
Subjective:      Marcel Schultz is a 62 y.o. male who presents with Foot Pain    Chief Complaint   Patient presents with   • Foot Pain     left foot, ankle swelling x2 days        - This is a very pleasant 62 y.o. male with complaints of pain swelling Lt foot and medial distal calf x 3 days. No trauma, fever, cp, sob          ALLERGIES:  Penicillins     PMH:  Past Medical History   Diagnosis Date   • COPD (chronic obstructive pulmonary disease) (CMS-[facility])    • Asthma    • Depression    • Sleep apnea    • Central sleep apnea 1/19/2017     AHI 67.1, minimum saturation 72%, on ASV 9/30/15 with 4 L of oxygen bleed in.        MEDS:    Current outpatient prescriptions:   •  clindamycin (CLEOCIN) 300 MG Cap, Take 1 Cap by mouth 3 times a day for 7 days., Disp: 21 Cap, Rfl: 0  •  Buprenorphine HCl-Naloxone HCl (SUBOXONE) 2-0.5 MG FILM, Place  under tongue., Disp: , Rfl:   •  SPIRIVA RESPIMAT 2.5 MCG/ACT Aero Soln, TAKE 2 PUFFS BY MOUTH EVERY DAY, Disp: , Rfl: 0  •  fenofibrate (TRIGLIDE) 160 MG tablet, Take 160 mg by mouth every day., Disp: , Rfl:   •  paroxetine (PAXIL) 40 MG tablet, Take 40 mg by mouth every day., Disp: , Rfl:   •  colesevelam (WELCHOL) 625 MG Tab, Take 1,875 mg by mouth 2 times a day, with meals., Disp: , Rfl:   •  vitamin D3, cholecalciferol, 1000 UNIT Tab, Take 1,000 Units by mouth every day., Disp: , Rfl:   •  Multiple Vitamin (MULTI VITAMIN MENS PO), Take  by mouth., Disp: , Rfl:   •  Glucosamine HCl 1500 MG Tab, Take  by mouth., Disp: , Rfl:   •  aspirin 81 MG tablet, Take 81 mg by mouth every day., Disp: , Rfl:   •  doxepin (SINEQUAN) 150 MG capsule, Take 150 mg by mouth every evening., Disp: , Rfl:     ** I have documented what I find to be significant in regards to past medical, social, family and surgical history  in my HPI or under PMH/PSH/FH review section, otherwise it is contributory **             HPI    Review of Systems   Constitutional: Negative for fever and chills.   Respiratory:  "Negative for shortness of breath.    Cardiovascular: Positive for leg swelling. Negative for chest pain.   Musculoskeletal: Positive for myalgias.   Skin: Positive for rash.   Neurological: Negative for dizziness and focal weakness.          Objective:     /88 mmHg  Pulse 70  Temp(Src) 36.8 °C (98.2 °F)  Resp 18  Ht 1.803 m (5' 11\")  Wt 86.183 kg (190 lb)  BMI 26.51 kg/m2  SpO2 91%     Physical Exam   Constitutional: He appears well-developed. No distress.   HENT:   Head: Normocephalic and atraumatic.   Eyes: Conjunctivae are normal.   Neck: Neck supple.   Cardiovascular: Regular rhythm.    No murmur heard.  Pulmonary/Chest: Effort normal. No respiratory distress.   Neurological: He is alert. He exhibits normal muscle tone.   Skin: Skin is warm and dry.   Psychiatric: He has a normal mood and affect. Judgment normal.   Nursing note and vitals reviewed.  LLE: + edema erythema            Assessment/Plan:         1. DVT of deep femoral vein, left (CMS-[facility])  REFERRAL TO ANTICOAGULATION MONITORING   2. Leg swelling  US-EXTREMITY VENOUS UNILATERAL-LOWER LEFT    clindamycin (CLEOCIN) 300 MG Cap             Dx & d/c instructions discussed w/ patient and/or family members. Follow up w/ Prvt Dr or here in 3-4 days if not getting better, sooner if needed,  ER if worse and UC/PCP unavailable.        Possible side effects (i.e. Rash, GI upset/constipation, sedation, elevation of BP or sugars) of any medications given discussed.         Addendum 8/9/2017: Discussed DVT finding. This may be chronic or new. Will cover w/ Xarelto for now, foot also w/ erythema so will cover w/ abx incase this is just cellulitis. Xarelto 15mg BID # 30 given as sample. Rx given for another week. Risks/benefits discussed, patient aware.  F/u w/ anticoag clinic tomorrow. 1st dose Xarelto given here.              "

## 2017-08-09 NOTE — MR AVS SNAPSHOT
"        Marcel Schultz   2017 2:15 PM   Office Visit   MRN: [id number]    Department:  [facility]   Dept Phone:  [phone]    Description:  Male : 1955   Provider:  Shabbir Adams M.D.           Reason for Visit     Foot Pain left foot, ankle swelling x2 days      Allergies as of 2017     Allergen Noted Reactions    Penicillins 2016         You were diagnosed with     Leg swelling   [[id number]]         Vital Signs     Blood Pressure Pulse Temperature Respirations Height Weight    124/88 mmHg 70 36.8 °C (98.2 °F) 18 1.803 m (5' 11\") 86.183 kg (190 lb)    Body Mass Index Oxygen Saturation Smoking Status             26.51 kg/m2 91% Former Smoker         Basic Information     Date Of Birth Sex Race Ethnicity Preferred Language    1955 Male White Non- English      Problem List              ICD-10-CM Priority Class Noted - Resolved    Central sleep apnea G47.31   2017 - Present    COPD (chronic obstructive pulmonary disease) (CMS-[facility]) J44.9   2017 - Present      Health Maintenance        Date Due Completion Dates    IMM DTaP/Tdap/Td Vaccine (1 - Tdap) 2/15/1974 ---    COLONOSCOPY 2/15/2005 ---    IMM ZOSTER VACCINE 2/15/2015 ---    IMM INFLUENZA (1) 2017, 2016            Current Immunizations     13-VALENT PCV PREVNAR 2017    Influenza TIV (IM) 2016    Influenza Vaccine Quad Inj (Preserved) 2017    Pneumococcal polysaccharide vaccine (PPSV-23) 2016      Below and/or attached are the medications your provider expects you to take. Review all of your home medications and newly ordered medications with your provider and/or pharmacist. Follow medication instructions as directed by your provider and/or pharmacist. Please keep your medication list with you and share with your provider. Update the information when medications are discontinued, doses are changed, or new medications (including over-the-counter products) are added; and carry " medication information at all times in the event of emergency situations     Allergies:  PENICILLINS - (reactions not documented)               Medications  Valid as of: August 09, 2017 -  4:28 PM    Generic Name Brand Name Tablet Size Instructions for use    Albuterol Sulfate (Aero Soln) albuterol 108 (90 BASE) MCG/ACT Inhale 2 Puffs by mouth every four hours as needed for Shortness of Breath.        Ascorbic Acid (Tab) Vitamin C 100 MG Take  by mouth.        Aspirin (Tab) aspirin 81 MG Take 81 mg by mouth every day.        Buprenorphine HCl-Naloxone HCl (FILM) Buprenorphine HCl-Naloxone HCl 2-0.5 MG Place  under tongue.        Chlorhexidine Gluconate (Solution) PERIDEX 0.12 % SWISH AND SPIT BY MOUTH FOR 60 SECONDS 3 TIMES DAILY FOR 2 WEEKS        Cholecalciferol (Tab) vitamin D3 (cholecalciferol) 1000 UNIT Take 1,000 Units by mouth every day.        Clindamycin HCl (Cap) CLEOCIN 300 MG TAKE 1 CAPSULE BY MOUTH EVERY 6 HOURS FOR 7 DAYS        Colesevelam HCl (Tab) WELCHOL 625 MG Take 1,875 mg by mouth 2 times a day, with meals.        Colestipol HCl (Tab) COLESTID 1 GM Take 2 g by mouth 2 Times a Day.        Doxepin HCl (Cap) SINEQUAN 150 MG Take 150 mg by mouth every evening.        Fenofibrate (Tab) TRIGLIDE 160 MG Take 160 mg by mouth every day.        FentaNYL (PATCH 72 HR) DURAGESIC 100 MCG/HR Apply 1 Patch to skin as directed every 72 hours.        FentaNYL (PATCH 72 HR) DURAGESIC 75 MCG/HR APPLY 1 PATCH EVERY 24 HOURS        Gabapentin (Cap) NEURONTIN 300 MG TAKE 1 ORAL CAPSULE 3 TIMES A DAY        Gabapentin (Tab) NEURONTIN 600 MG Take 600 mg by mouth.        Glucosamine HCl (Tab) Glucosamine HCl 1500 MG Take  by mouth.        Magnesium Gluconate (Tab) MAG-G 500 MG Take 500 mg by mouth 3 times a day.        Mirtazapine (Tab) REMERON 15 MG Take 15 mg by mouth every evening.        Multiple Vitamin   Take  by mouth.        Omeprazole (CAPSULE DELAYED RELEASE) PRILOSEC 20 MG Take 20 mg by mouth every day.           PARoxetine HCl (Tab) PAXIL 40 MG Take 40 mg by mouth every day.        Polyethylene Glycol 3350   Take  by mouth.        Tiotropium Bromide Monohydrate (Cap) SPIRIVA 18 MCG Inhale 18 mcg by mouth every day.        Tiotropium Bromide Monohydrate (Aero Soln) SPIRIVA RESPIMAT 2.5 MCG/ACT TAKE 2 PUFFS BY MOUTH EVERY DAY        .                 Medicines prescribed today were sent to:     [facility]Y #[id number] - [place], [place] - [address]    [address] [place] [address]    Phone: [phone] Fax: [phone]    Open 24 Hours?: No    [facility]    [address] [place] [address]    Phone: [phone] Fax: [phone]      Medication refill instructions:       If your prescription bottle indicates you have medication refills left, it is not necessary to call your provider’s office. Please contact your pharmacy and they will refill your medication.    If your prescription bottle indicates you do not have any refills left, you may request refills at any time through one of the following ways: The online [facility] system (except Urgent Care), by calling your provider’s office, or by asking your pharmacy to contact your provider’s office with a refill request. Medication refills are processed only during regular business hours and may not be available until the next business day. Your provider may request additional information or to have a follow-up visit with you prior to refilling your medication.   *Please Note: Medication refills are assigned a new Rx number when refilled electronically. Your pharmacy may indicate that no refills were authorized even though a new prescription for the same medication is available at the pharmacy. Please request the medicine by name with the pharmacy before contacting your provider for a refill.        Your To Do List     Future Labs/Procedures Complete By Expires    US-EXTREMITY VENOUS UNILATERAL-LOWER LEFT  As directed 8/9/2018         [facility] Access Code: Activation  code not generated  Current MyChart Status: Active

## 2019-10-09 ENCOUNTER — OFFICE VISIT (OUTPATIENT)
Dept: URGENT CARE | Facility: PHYSICIAN GROUP | Age: 64
End: 2019-10-09
Payer: COMMERCIAL

## 2019-10-09 VITALS
SYSTOLIC BLOOD PRESSURE: 136 MMHG | BODY MASS INDEX: 28 KG/M2 | HEIGHT: 71 IN | HEART RATE: 74 BPM | WEIGHT: 200 LBS | RESPIRATION RATE: 18 BRPM | OXYGEN SATURATION: 92 % | DIASTOLIC BLOOD PRESSURE: 82 MMHG | TEMPERATURE: 97.6 F

## 2019-10-09 DIAGNOSIS — L08.9 LACERATION OF LOWER LEG WITH INFECTION, RIGHT, INITIAL ENCOUNTER: ICD-10-CM

## 2019-10-09 DIAGNOSIS — S81.811A LACERATION OF LOWER LEG WITH INFECTION, RIGHT, INITIAL ENCOUNTER: ICD-10-CM

## 2019-10-09 PROCEDURE — 99214 OFFICE O/P EST MOD 30 MIN: CPT | Performed by: PHYSICIAN ASSISTANT

## 2019-10-09 RX ORDER — CLINDAMYCIN HYDROCHLORIDE 300 MG/1
300 CAPSULE ORAL 3 TIMES DAILY
Qty: 30 CAP | Refills: 0 | Status: SHIPPED | OUTPATIENT
Start: 2019-10-09 | End: 2019-10-19

## 2019-10-09 NOTE — PROGRESS NOTES
Subjective:      Marcel Schultz is a 64 y.o. male who presents with Wound Infection (poss infection around suture site on R leg)    PMH:  has a past medical history of Asthma, Central sleep apnea (1/19/2017), COPD (chronic obstructive pulmonary disease) ([facility]), Depression, and Sleep apnea.  MEDS:   Current Outpatient Medications:   •  Buprenorphine HCl-Naloxone HCl (SUBOXONE) 2-0.5 MG FILM, Place  under tongue., Disp: , Rfl:   •  fenofibrate (TRIGLIDE) 160 MG tablet, Take 160 mg by mouth every day., Disp: , Rfl:   •  paroxetine (PAXIL) 40 MG tablet, Take 40 mg by mouth every day., Disp: , Rfl:   •  colesevelam (WELCHOL) 625 MG Tab, Take 1,875 mg by mouth 2 times a day, with meals., Disp: , Rfl:   •  vitamin D3, cholecalciferol, 1000 UNIT Tab, Take 1,000 Units by mouth every day., Disp: , Rfl:   •  Multiple Vitamin (MULTI VITAMIN MENS PO), Take  by mouth., Disp: , Rfl:   •  Glucosamine HCl 1500 MG Tab, Take  by mouth., Disp: , Rfl:   •  aspirin 81 MG tablet, Take 81 mg by mouth every day., Disp: , Rfl:   •  doxepin (SINEQUAN) 150 MG capsule, Take 150 mg by mouth every evening., Disp: , Rfl:   •  SPIRIVA RESPIMAT 2.5 MCG/ACT Aero Soln, TAKE 2 PUFFS BY MOUTH EVERY DAY, Disp: , Rfl: 0  ALLERGIES:   Allergies   Allergen Reactions   • Penicillins      SURGHX:   Past Surgical History:   Procedure Laterality Date   • CHOLECYSTECTOMY     • HERNIA REPAIR       SOCHX:  reports that he quit smoking about 6 years ago. His smoking use included cigarettes. He has a 60.00 pack-year smoking history. He has never used smokeless tobacco. He reports that he does not drink alcohol or use drugs.  FH: Reviewed with patient, not pertinent to this visit.           Patient presents with:  Wound Infection: poss infection around suture site on R leg.  Pt has cut on his leg, sutured 10 days ago at the ER in [place].  Wound is starting to become more tender, looks red, and skin at base of the stitches is becoming crusty.  No swelling,   "drainage or discharge.  No fever or chills.    Wound Infection   This is a new problem. The current episode started in the past 7 days. The problem occurs constantly. The problem has been gradually worsening. Associated symptoms include a rash. Pertinent negatives include no fever or numbness. The symptoms are aggravated by walking. He has tried NSAIDs, position changes and relaxation for the symptoms. The treatment provided mild relief.       Review of Systems   Constitutional: Negative for fever.   Skin: Positive for rash. Negative for itching.   Neurological: Negative for numbness.   All other systems reviewed and are negative.         Objective:     /82 (BP Location: Right arm, Patient Position: Sitting, BP Cuff Size: Small adult)   Pulse 74   Temp 36.4 °C (97.6 °F) (Temporal)   Resp 18   Ht 1.803 m (5' 11\")   Wt 90.7 kg (200 lb)   SpO2 92%   BMI 27.89 kg/m²      Physical Exam   Constitutional: He is oriented to person, place, and time. He appears well-developed and well-nourished. No distress.   HENT:   Head: Normocephalic and atraumatic.   Nose: Nose normal.   Eyes: Pupils are equal, round, and reactive to light. Conjunctivae and EOM are normal.   Neck: Normal range of motion. Neck supple.   Cardiovascular: Normal rate and intact distal pulses.   Pulmonary/Chest: Effort normal.   Abdominal: Soft.   Musculoskeletal:        Legs:  Neurological: He is alert and oriented to person, place, and time. He exhibits normal muscle tone.   Skin: Skin is warm and dry. Capillary refill takes less than 2 seconds.   Psychiatric: He has a normal mood and affect.   Nursing note and vitals reviewed.         Assessment/Plan:     1. Laceration of lower leg with infection, right, initial encounter  clindamycin (CLEOCIN) 300 MG Cap     Pt to begin antibiotics today.      Follow up with PCP for suture removal in 4-7 days.      PT should follow up with PCP in 1-2 days for re-evaluation if symptoms have not improved.  " Discussed red flags and reasons to return to UC or ED.  Pt and/or family verbalized understanding of diagnosis and follow up instructions and was offered informational handout on diagnosis.  PT discharged.

## 2019-10-13 ASSESSMENT — ENCOUNTER SYMPTOMS
NUMBNESS: 0
FEVER: 0

## 2019-10-14 ENCOUNTER — OFFICE VISIT (OUTPATIENT)
Dept: URGENT CARE | Facility: PHYSICIAN GROUP | Age: 64
End: 2019-10-14
Payer: COMMERCIAL

## 2019-10-14 VITALS
RESPIRATION RATE: 16 BRPM | HEIGHT: 71 IN | HEART RATE: 76 BPM | BODY MASS INDEX: 29.54 KG/M2 | DIASTOLIC BLOOD PRESSURE: 68 MMHG | SYSTOLIC BLOOD PRESSURE: 132 MMHG | WEIGHT: 211 LBS | OXYGEN SATURATION: 88 % | TEMPERATURE: 98.8 F

## 2019-10-14 DIAGNOSIS — L08.9: ICD-10-CM

## 2019-10-14 DIAGNOSIS — S81.811D: ICD-10-CM

## 2019-10-14 DIAGNOSIS — Z51.89 ENCOUNTER FOR WOUND RE-CHECK: ICD-10-CM

## 2019-10-14 PROCEDURE — 99213 OFFICE O/P EST LOW 20 MIN: CPT | Performed by: PHYSICIAN ASSISTANT

## 2019-10-17 NOTE — PROGRESS NOTES
Subjective:      Marcel Schultz is a 64 y.o. male who presents with Wound Check (on R leg)    PMH:  has a past medical history of Asthma, Central sleep apnea (1/19/2017), COPD (chronic obstructive pulmonary disease) ([facility]), Depression, and Sleep apnea.  MEDS:   Current Outpatient Medications:   •  ANORO ELLIPTA 62.5-25 MCG/INH AEROSOL POWDER, BREATH ACTIVATED inhaler, TAKE 1 PUFF BY MOUTH DAILY, Disp: , Rfl: 0  •  clindamycin (CLEOCIN) 300 MG Cap, Take 1 Cap by mouth 3 times a day for 10 days., Disp: 30 Cap, Rfl: 0  •  Buprenorphine HCl-Naloxone HCl (SUBOXONE) 2-0.5 MG FILM, Place  under tongue., Disp: , Rfl:   •  fenofibrate (TRIGLIDE) 160 MG tablet, Take 160 mg by mouth every day., Disp: , Rfl:   •  paroxetine (PAXIL) 40 MG tablet, Take 40 mg by mouth every day., Disp: , Rfl:   •  colesevelam (WELCHOL) 625 MG Tab, Take 1,875 mg by mouth 2 times a day, with meals., Disp: , Rfl:   •  vitamin D3, cholecalciferol, 1000 UNIT Tab, Take 1,000 Units by mouth every day., Disp: , Rfl:   •  Multiple Vitamin (MULTI VITAMIN MENS PO), Take  by mouth., Disp: , Rfl:   •  Glucosamine HCl 1500 MG Tab, Take  by mouth., Disp: , Rfl:   •  aspirin 81 MG tablet, Take 81 mg by mouth every day., Disp: , Rfl:   •  doxepin (SINEQUAN) 150 MG capsule, Take 150 mg by mouth every evening., Disp: , Rfl:   •  SPIRIVA RESPIMAT 2.5 MCG/ACT Aero Soln, TAKE 2 PUFFS BY MOUTH EVERY DAY, Disp: , Rfl: 0  ALLERGIES:   Allergies   Allergen Reactions   • Penicillins      SURGHX:   Past Surgical History:   Procedure Laterality Date   • CHOLECYSTECTOMY     • HERNIA REPAIR       SOCHX:  reports that he quit smoking about 6 years ago. His smoking use included cigarettes. He has a 60.00 pack-year smoking history. He has never used smokeless tobacco. He reports that he does not drink alcohol or use drugs.  FH: Reviewed with patient, not pertinent to this visit.           Patient presents with:  Wound Check: on R leg, pt would like to know if sutures are ready  "to be removed yet.          Wound Check   He was originally treated 10 to 14 days ago. Previous treatment included oral antibiotics and laceration repair. There has been no drainage from the wound. The redness has improved. The swelling has improved. The pain has improved. He has no difficulty moving the affected extremity or digit.       Review of Systems   All other systems reviewed and are negative.         Objective:     /68   Pulse 76   Temp 37.1 °C (98.8 °F)   Resp 16   Ht 1.803 m (5' 11\")   Wt 95.7 kg (211 lb)   SpO2 88%   BMI 29.43 kg/m²      Physical Exam       Running stitch sutures in place in 12 cm laceration that is healing but does have 2 areas of the wound that are not healing at the same rate.       Assessment/Plan:     1. Laceration of lower leg with infection, right, subsequent encounter     2. Encounter for wound re-check       Sutures have been left in place as I feel taking them out at this time will cause complete dehiscence due to non healing of part of this laceration.      Continue/finish antibiotics. Return when antibiotics are finished.      Discussed red flags and reasons to return to UC or ED.  Pt and/or family verbalized understanding of diagnosis and follow up instructions and was offered informational handout on diagnosis.  PT discharged.     "

## 2019-10-31 ENCOUNTER — OFFICE VISIT (OUTPATIENT)
Dept: URGENT CARE | Facility: PHYSICIAN GROUP | Age: 64
End: 2019-10-31
Payer: COMMERCIAL

## 2019-10-31 VITALS
BODY MASS INDEX: 29.54 KG/M2 | TEMPERATURE: 98.5 F | RESPIRATION RATE: 16 BRPM | HEIGHT: 71 IN | HEART RATE: 74 BPM | DIASTOLIC BLOOD PRESSURE: 78 MMHG | OXYGEN SATURATION: 90 % | SYSTOLIC BLOOD PRESSURE: 130 MMHG | WEIGHT: 211 LBS

## 2019-10-31 DIAGNOSIS — Z48.02 VISIT FOR SUTURE REMOVAL: ICD-10-CM

## 2019-10-31 PROCEDURE — 99212 OFFICE O/P EST SF 10 MIN: CPT | Performed by: PHYSICIAN ASSISTANT

## 2019-10-31 RX ORDER — BUPRENORPHINE HYDROCHLORIDE AND NALOXONE HYDROCHLORIDE DIHYDRATE 2; .5 MG/1; MG/1
TABLET SUBLINGUAL
COMMUNITY

## 2019-10-31 RX ORDER — MONTELUKAST SODIUM 4 MG/1
2 TABLET, CHEWABLE ORAL
Refills: 1 | COMMUNITY
Start: 2019-09-23

## 2019-10-31 RX ORDER — ERGOCALCIFEROL 1.25 MG/1
CAPSULE ORAL
Refills: 0 | COMMUNITY
Start: 2019-10-06

## 2019-10-31 RX ORDER — LISINOPRIL 20 MG/1
20 TABLET ORAL
Refills: 1 | COMMUNITY
Start: 2019-10-21

## 2019-10-31 RX ORDER — PAROXETINE 10 MG/1
10 TABLET, FILM COATED ORAL
COMMUNITY

## 2019-10-31 RX ORDER — DOXEPIN HYDROCHLORIDE 100 MG/1
CAPSULE ORAL
Refills: 0 | COMMUNITY
Start: 2019-10-01

## 2019-10-31 RX ORDER — AMLODIPINE BESYLATE 5 MG/1
5 TABLET ORAL
Refills: 0 | COMMUNITY
Start: 2019-09-30

## 2019-10-31 SDOH — HEALTH STABILITY: MENTAL HEALTH: HOW MANY STANDARD DRINKS CONTAINING ALCOHOL DO YOU HAVE ON A TYPICAL DAY?: 1 OR 2

## 2019-10-31 SDOH — HEALTH STABILITY: MENTAL HEALTH: HOW OFTEN DO YOU HAVE A DRINK CONTAINING ALCOHOL?: MONTHLY OR LESS

## 2019-10-31 SDOH — HEALTH STABILITY: MENTAL HEALTH: HOW OFTEN DO YOU HAVE 6 OR MORE DRINKS ON ONE OCCASION?: NEVER

## 2019-10-31 ASSESSMENT — ENCOUNTER SYMPTOMS: FEVER: 0

## 2019-10-31 NOTE — PROGRESS NOTES
"Subjective:      Marcel Schultz is a 64 y.o. male who presents with Wound Check (R. Calf stitches)    Pt PMH, SocHx, SurgHx, FamHx, Drug allergies and medications reviewed with pt/EPIC.      Family history reviewed, it is not pertinent to this complaint.           Patient presents with:  Wound Check: R. Calf stitches to be removed.          Wound Check   He was originally treated more than 14 days ago. Previous treatment included oral antibiotics, wound cleansing or irrigation and laceration repair. There has been no drainage from the wound. There is no redness present. There is no swelling present. There is no pain present. He has no difficulty moving the affected extremity or digit.       Review of Systems   Constitutional: Negative for fever.   Skin: Negative for rash.   All other systems reviewed and are negative.         Objective:     /78 (BP Location: Left arm, Patient Position: Sitting, BP Cuff Size: Adult)   Pulse 74   Temp 36.9 °C (98.5 °F) (Temporal)   Resp 16   Ht 1.803 m (5' 11\")   Wt 95.7 kg (211 lb)   SpO2 90%   BMI 29.43 kg/m²      Physical Exam       Suture removal:  Skin is well healed without erythema, edema, purulent drainage, redness or warmth.    Skin cleansed prior to suture removal.   11 sutures removed from well healed laceration.    Steri strips applied to wound edges to help support scabs and to prevent dehiscence.     Wound care instructions given to patient.         Assessment/Plan:     1. Visit for suture removal     PT should follow up with PCP in 1-2 days for re-evaluation if symptoms have not improved.  Discussed red flags and reasons to return to UC or ED.  Pt and/or family verbalized understanding of diagnosis and follow up instructions and was offered informational handout on diagnosis.  PT discharged.       "

## 2021-03-03 DIAGNOSIS — Z23 NEED FOR VACCINATION: ICD-10-CM

## 2022-07-01 ENCOUNTER — HOSPITAL ENCOUNTER (OUTPATIENT)
Dept: RADIOLOGY | Facility: MEDICAL CENTER | Age: 67
End: 2022-07-01
Attending: EMERGENCY MEDICINE
Payer: COMMERCIAL

## 2022-07-01 ENCOUNTER — TELEPHONE (OUTPATIENT)
Dept: URGENT CARE | Facility: PHYSICIAN GROUP | Age: 67
End: 2022-07-01

## 2022-07-01 ENCOUNTER — OFFICE VISIT (OUTPATIENT)
Dept: URGENT CARE | Facility: PHYSICIAN GROUP | Age: 67
End: 2022-07-01
Payer: COMMERCIAL

## 2022-07-01 VITALS
OXYGEN SATURATION: 91 % | HEART RATE: 74 BPM | RESPIRATION RATE: 18 BRPM | DIASTOLIC BLOOD PRESSURE: 86 MMHG | WEIGHT: 207 LBS | TEMPERATURE: 98.8 F | HEIGHT: 71 IN | SYSTOLIC BLOOD PRESSURE: 124 MMHG | BODY MASS INDEX: 28.98 KG/M2

## 2022-07-01 DIAGNOSIS — M79.662 PAIN AND SWELLING OF LEFT LOWER LEG: ICD-10-CM

## 2022-07-01 DIAGNOSIS — M25.572 ACUTE LEFT ANKLE PAIN: ICD-10-CM

## 2022-07-01 DIAGNOSIS — M79.89 PAIN AND SWELLING OF LEFT LOWER LEG: ICD-10-CM

## 2022-07-01 PROCEDURE — 99213 OFFICE O/P EST LOW 20 MIN: CPT | Performed by: EMERGENCY MEDICINE

## 2022-07-01 ASSESSMENT — ENCOUNTER SYMPTOMS
FOCAL WEAKNESS: 0
TINGLING: 0
NUMBNESS: 0
LOSS OF SENSATION: 0
INABILITY TO BEAR WEIGHT: 0
FEVER: 0
SENSORY CHANGE: 0

## 2022-07-01 NOTE — PROGRESS NOTES
"Subjective     Marcel Schultz is a 67 y.o. male who presents with Ankle Pain (Swollen )            Ankle Pain   The incident occurred 5 to 7 days ago. There was no injury mechanism. The pain is present in the left ankle and left leg. Pertinent negatives include no inability to bear weight, loss of sensation, numbness or tingling. The symptoms are aggravated by palpation, movement and weight bearing.   PMH significant for remote ORIF of left ankle, history of DVT.    Review of Systems   Constitutional: Negative for fever.   Musculoskeletal: Positive for joint pain.   Skin: Negative for rash.   Neurological: Negative for tingling, sensory change, focal weakness and numbness.              Objective     /86 (BP Location: Left arm, Patient Position: Sitting, BP Cuff Size: Adult)   Pulse 74   Temp 37.1 °C (98.8 °F) (Temporal)   Resp 18   Ht 1.803 m (5' 11\")   Wt 93.9 kg (207 lb)   SpO2 91%   BMI 28.87 kg/m²      Physical Exam  Constitutional:       General: He is not in acute distress.     Appearance: He is well-developed. He is not ill-appearing.   Cardiovascular:      Pulses:           Posterior tibial pulses are 2+ on the left side.      Comments: Distal capillary refill intact.  Pulmonary:      Effort: Pulmonary effort is normal.   Musculoskeletal:      Left lower leg: Tenderness present. No bony tenderness. 1+ Edema present.      Left ankle: Swelling present. No deformity or ecchymosis. Tenderness present over the lateral malleolus and medial malleolus. No base of 5th metatarsal or proximal fibula tenderness. Decreased range of motion.      Left Achilles Tendon: Tenderness present. No defects.   Skin:     General: Skin is warm and dry.      Findings: No erythema or wound.      Comments: Hyperpigmentation of left distal lower leg up to ankle noted.   Neurological:      Mental Status: He is alert.      Comments: Distal motor function intact.   Psychiatric:         Behavior: Behavior is cooperative. "                             Assessment & Plan        1. Pain and swelling of left lower leg  - US-EXTREMITY VENOUS LOWER UNILAT LEFT; Future    2. Acute left ankle pain  - DX-ANKLE 3+ VIEWS LEFT; Future

## 2022-07-02 ENCOUNTER — HOSPITAL ENCOUNTER (OUTPATIENT)
Dept: RADIOLOGY | Facility: MEDICAL CENTER | Age: 67
End: 2022-07-02
Attending: EMERGENCY MEDICINE
Payer: COMMERCIAL

## 2022-07-02 DIAGNOSIS — M79.662 PAIN AND SWELLING OF LEFT LOWER LEG: ICD-10-CM

## 2022-07-02 DIAGNOSIS — M79.89 PAIN AND SWELLING OF LEFT LOWER LEG: ICD-10-CM

## 2022-07-02 PROCEDURE — 73610 X-RAY EXAM OF ANKLE: CPT | Mod: LT

## 2022-07-02 PROCEDURE — 93971 EXTREMITY STUDY: CPT | Mod: LT

## 2022-07-03 ENCOUNTER — TELEPHONE (OUTPATIENT)
Dept: URGENT CARE | Facility: PHYSICIAN GROUP | Age: 67
End: 2022-07-03
Payer: COMMERCIAL

## 2022-07-03 DIAGNOSIS — I82.5Z2 CHRONIC DEEP VEIN THROMBOSIS (DVT) OF DISTAL VEIN OF LEFT LOWER EXTREMITY (HCC): ICD-10-CM

## 2022-07-03 NOTE — TELEPHONE ENCOUNTER
07/02/22 15:00  Advised patient of USV with chronic DVT, increased from prior study.  Since symptomatic offered anticoagulant therapy pending discussion with PCP and anticoagulation clinic. Patient has IVC filter, tolerated OAC therapy in the past. Patient agreed to treatment.

## 2022-07-06 ENCOUNTER — TELEPHONE (OUTPATIENT)
Dept: VASCULAR LAB | Facility: MEDICAL CENTER | Age: 67
End: 2022-07-06
Payer: COMMERCIAL

## 2022-07-06 NOTE — TELEPHONE ENCOUNTER
[facility]    Received anticoag referral for Xarelto due to DVT from Dr. Lr on 7/3/22    Called pt to schedule appt to establish care - no answer. LVM.    Insurance: Leelee MITCHELL  PCP: Non-[facility]  Locations to be seen: [address]    [facility] at [phone], fax [phone]    Marco Antonio Lorenzo, IraisD, BCACP

## 2022-07-08 ENCOUNTER — TELEPHONE (OUTPATIENT)
Dept: VASCULAR LAB | Facility: MEDICAL CENTER | Age: 67
End: 2022-07-08
Payer: COMMERCIAL

## 2022-07-08 NOTE — TELEPHONE ENCOUNTER
[facility]     Received anticoag referral for Xarelto due to DVT from Dr. Lr on 7/3/22     Called pt to schedule appt to establish care - no answer. LVM.    Letter sent.     Insurance: Leelee MITCHELL  PCP: Non-[facility]  Locations to be seen: [facility] at [phone], fax [phone]     Marco Antonio Lorenzo, IraisD, BCACP

## 2022-07-12 ENCOUNTER — TELEPHONE (OUTPATIENT)
Dept: VASCULAR LAB | Facility: MEDICAL CENTER | Age: 67
End: 2022-07-12
Payer: COMMERCIAL

## 2022-07-12 NOTE — TELEPHONE ENCOUNTER
[facility]     Received anticoag referral for Xarelto due to DVT from Dr. Lr on 7/3/22     3rd call  LM to establish care     Insurance: Leelee MITCHELL  PCP: Non-[facility]  Locations to be seen: [facility] at [phone], fax [phone]     Joanne Pineda, PharmD

## 2022-07-19 ENCOUNTER — HOSPITAL ENCOUNTER (OUTPATIENT)
Facility: MEDICAL CENTER | Age: 67
End: 2022-07-19
Attending: PHYSICIAN ASSISTANT
Payer: COMMERCIAL

## 2022-07-19 ENCOUNTER — OFFICE VISIT (OUTPATIENT)
Dept: URGENT CARE | Facility: PHYSICIAN GROUP | Age: 67
End: 2022-07-19
Payer: COMMERCIAL

## 2022-07-19 VITALS
WEIGHT: 205 LBS | TEMPERATURE: 96.5 F | DIASTOLIC BLOOD PRESSURE: 68 MMHG | BODY MASS INDEX: 28.7 KG/M2 | HEIGHT: 71 IN | SYSTOLIC BLOOD PRESSURE: 134 MMHG | HEART RATE: 72 BPM | OXYGEN SATURATION: 92 % | RESPIRATION RATE: 16 BRPM

## 2022-07-19 DIAGNOSIS — T14.8XXA WOUND INFECTION: ICD-10-CM

## 2022-07-19 DIAGNOSIS — L03.116 CELLULITIS OF LEFT LOWER EXTREMITY: ICD-10-CM

## 2022-07-19 DIAGNOSIS — L08.9 WOUND INFECTION: ICD-10-CM

## 2022-07-19 DIAGNOSIS — I82.5Z2 CHRONIC DEEP VEIN THROMBOSIS (DVT) OF DISTAL VEIN OF LEFT LOWER EXTREMITY (HCC): ICD-10-CM

## 2022-07-19 PROCEDURE — 99214 OFFICE O/P EST MOD 30 MIN: CPT | Mod: 25 | Performed by: PHYSICIAN ASSISTANT

## 2022-07-19 PROCEDURE — 87077 CULTURE AEROBIC IDENTIFY: CPT

## 2022-07-19 PROCEDURE — 87070 CULTURE OTHR SPECIMN AEROBIC: CPT

## 2022-07-19 PROCEDURE — 87205 SMEAR GRAM STAIN: CPT

## 2022-07-19 PROCEDURE — 87186 SC STD MICRODIL/AGAR DIL: CPT

## 2022-07-19 PROCEDURE — 90471 IMMUNIZATION ADMIN: CPT | Performed by: PHYSICIAN ASSISTANT

## 2022-07-19 PROCEDURE — 90715 TDAP VACCINE 7 YRS/> IM: CPT | Performed by: PHYSICIAN ASSISTANT

## 2022-07-19 RX ORDER — CLINDAMYCIN HYDROCHLORIDE 300 MG/1
300 CAPSULE ORAL 3 TIMES DAILY
Qty: 21 CAPSULE | Refills: 0 | Status: SHIPPED | OUTPATIENT
Start: 2022-07-19 | End: 2022-07-26

## 2022-07-19 RX ORDER — CLINDAMYCIN HYDROCHLORIDE 300 MG/1
300 CAPSULE ORAL 3 TIMES DAILY
COMMUNITY
End: 2022-07-19

## 2022-07-19 ASSESSMENT — ENCOUNTER SYMPTOMS
CHILLS: 0
FEVER: 0
PALPITATIONS: 0
SHORTNESS OF BREATH: 0
VOMITING: 0
NAUSEA: 0

## 2022-07-19 NOTE — PROGRESS NOTES
Subjective:   Marcel Schultz is a 67 y.o. male who presents for Leg Swelling (Open sore on left leg. Discharge. Onset 5 days/Last seen for swelling and clots. Used heating pad that might have caused injury. /Taking unused prior prescription of clindamycin.)        Patient presents with concerns of open wound on his left lower extremity that developed approximately 5 days ago after he left heating pad on the area for prolonged period of time.  States that he noticed that he had an open wound in the area the following day and it has been persistently seeping since that initially appeared.  He also developed surrounding redness, pain, swelling.  Due to concerns of infection he he had taken clindamycin, which she had left over.  He has been on this for 4 days and states that he is experienced significant improvement in his swelling, redness, pain since starting this.  He denies nausea, vomiting, fevers, chills.  Patient has history of a DVT in the left lower extremity and he was using heating pad to alleviate his pain.  He has also been taking ibuprofen and Tylenol for pain and these provide significant symptomatic relief.  He is taking Xarelto for his DVT.      Review of Systems   Constitutional: Negative for chills and fever.   Respiratory: Negative for shortness of breath.    Cardiovascular: Negative for chest pain and palpitations.   Gastrointestinal: Negative for nausea and vomiting.       PMH:  has a past medical history of Asthma, Central sleep apnea (1/19/2017), COPD (chronic obstructive pulmonary disease) ([facility]), Depression, and Sleep apnea.  MEDS:   Current Outpatient Medications:   •  clindamycin (CLEOCIN) 300 MG Cap, Take 1 Capsule by mouth 3 times a day for 7 days., Disp: 21 Capsule, Rfl: 0  •  rivaroxaban (XARELTO) 15 MG Tab tablet, Take 1 Tablet by mouth 2 times a day for 21 days., Disp: 42 Tablet, Rfl: 0  •  amLODIPine (NORVASC) 5 MG Tab, Take 5 mg by mouth every day., Disp: , Rfl: 0  •  colestipol  (COLESTID) 1 GM Tab, Take 2 g by mouth., Disp: , Rfl: 1  •  doxepin (SINEQUAN) 100 MG Cap, TAKE 2 CAPSULES BY MOUTH EVERY DAY AT BEDTIME, Disp: , Rfl: 0  •  lisinopril (PRINIVIL) 20 MG Tab, Take 20 mg by mouth every day., Disp: , Rfl: 1  •  ANORO ELLIPTA 62.5-25 MCG/INH AEROSOL POWDER, BREATH ACTIVATED inhaler, TAKE 1 PUFF BY MOUTH DAILY, Disp: , Rfl: 0  •  buprenorphine HCl-naloxone HCl (SUBOXONE) 2-0.5 MG FILM, Place  under tongue., Disp: , Rfl:   •  SPIRIVA RESPIMAT 2.5 MCG/ACT Aero Soln, TAKE 2 PUFFS BY MOUTH EVERY DAY, Disp: , Rfl: 0  •  fenofibrate (TRIGLIDE) 160 MG tablet, Take 160 mg by mouth every day., Disp: , Rfl:   •  paroxetine (PAXIL) 40 MG tablet, Take 40 mg by mouth every day., Disp: , Rfl:   •  colesevelam (WELCHOL) 625 MG Tab, Take 1,875 mg by mouth 2 times a day, with meals., Disp: , Rfl:   •  Multiple Vitamin (MULTI VITAMIN MENS PO), Take  by mouth., Disp: , Rfl:   •  Glucosamine HCl 1500 MG Tab, Take  by mouth., Disp: , Rfl:   •  aspirin 81 MG tablet, Take 81 mg by mouth every day., Disp: , Rfl:   •  ergocalciferol (DRISDOL) 85880 UNIT capsule, TAKE ONE CAPSULE BY MOUTH ONCE A WEEK THEN OTC 2000 3 TIMES A DAY, Disp: , Rfl: 0  •  buprenorphine-naloxone (SUBOXONE) 2-0.5 MG SL Tab SL tablet, Place  under tongue., Disp: , Rfl:   •  PARoxetine (PAXIL) 10 MG Tab, Take 10 mg by mouth., Disp: , Rfl:   •  vitamin D3, cholecalciferol, 1000 UNIT Tab, Take 1,000 Units by mouth every day., Disp: , Rfl:   •  doxepin (SINEQUAN) 150 MG capsule, Take 150 mg by mouth every evening., Disp: , Rfl:   ALLERGIES:   Allergies   Allergen Reactions   • Penicillins      SURGHX:   Past Surgical History:   Procedure Laterality Date   • CHOLECYSTECTOMY     • HERNIA REPAIR       SOCHX:  reports that he quit smoking about 9 years ago. His smoking use included cigarettes. He has a 60.00 pack-year smoking history. He has never used smokeless tobacco. He reports previous alcohol use. He reports that he does not use drugs.  FH:  "Family history was reviewed, no pertinent findings to report   Objective:   /68 (BP Location: Left arm, Patient Position: Sitting, BP Cuff Size: Adult)   Pulse 72   Temp 35.8 °C (96.5 °F) (Temporal)   Resp 16   Ht 1.803 m (5' 11\")   Wt 93 kg (205 lb)   SpO2 92%   BMI 28.59 kg/m²   Physical Exam  Vitals reviewed.   Constitutional:       General: He is not in acute distress.     Appearance: Normal appearance. He is well-developed. He is not toxic-appearing.   HENT:      Head: Normocephalic and atraumatic.      Right Ear: External ear normal.      Left Ear: External ear normal.      Nose: Nose normal.   Cardiovascular:      Rate and Rhythm: Normal rate and regular rhythm.   Pulmonary:      Effort: Pulmonary effort is normal. No respiratory distress.      Breath sounds: No stridor.   Musculoskeletal:      Comments: 1+ pitting edema of left lower extremity.  There is a 2 cm open wound on the medial aspect of the left lower extremity.  This is oozing yellow and bloody discharge.  There is approximately 7 cm of surrounding erythema, but no focal edema.  Moderate warmth.  Dorsalis pedis and posterior tibial pulses 2+.  Sensation grossly intact.   Skin:     General: Skin is dry.   Neurological:      Comments: Alert and oriented.    Psychiatric:         Speech: Speech normal.         Behavior: Behavior normal.           Assessment/Plan:   1. Cellulitis of left lower extremity  - clindamycin (CLEOCIN) 300 MG Cap; Take 1 Capsule by mouth 3 times a day for 7 days.  Dispense: 21 Capsule; Refill: 0  - CULTURE WOUND W/ GRAM STAIN; Future  - Tdap =>8yo IM    2. Wound infection  - CULTURE WOUND W/ GRAM STAIN; Future  - Tdap =>8yo IM    3. Chronic deep vein thrombosis (DVT) of distal vein of left lower extremity (HCC)    1. Patient has open wound of the left lower extremity with secondary cellulitis.  This does not appear to be a complication of his DVT.  We will continue patient on clindamycin.  Wound culture obtained.  " I will contact patient with culture results and adjust treatment plan as indicated.  May continue OTC NSAIDs and Tylenol as needed for pain.  I also recommend applying ice.     I would like patient to follow-up with his PCP in 2 to 3 days for recheck.  He should be seen sooner with any new or worsening symptoms.    2.  Patient to continue Xarelto as prescribed.  Follow-up with PCP in 2 to 3 days for further management.  Red flag symptoms-to ED.    Differential diagnosis, natural history, supportive care, and indications for immediate follow-up discussed.

## 2022-07-20 ENCOUNTER — TELEPHONE (OUTPATIENT)
Dept: VASCULAR LAB | Facility: MEDICAL CENTER | Age: 67
End: 2022-07-20
Payer: COMMERCIAL

## 2022-07-20 DIAGNOSIS — L08.9 WOUND INFECTION: ICD-10-CM

## 2022-07-20 DIAGNOSIS — T14.8XXA WOUND INFECTION: ICD-10-CM

## 2022-07-20 DIAGNOSIS — L03.116 CELLULITIS OF LEFT LOWER EXTREMITY: ICD-10-CM

## 2022-07-20 LAB
GRAM STN SPEC: NORMAL
SIGNIFICANT IND 70042: NORMAL
SITE SITE: NORMAL
SOURCE SOURCE: NORMAL

## 2022-07-20 NOTE — TELEPHONE ENCOUNTER
[facility]     Received anticoag referral for Xarelto due to DVT from Dr. Lr on 7/3/22     4th call  LM to establish care     Insurance: Leelee MITCHELL  PCP: Non-[facility]  Locations to be seen: [facility] at [phone], fax [phone]     Joanne Pineda, PharmD

## 2022-07-23 LAB
BACTERIA WND AEROBE CULT: ABNORMAL
BACTERIA WND AEROBE CULT: ABNORMAL
GRAM STN SPEC: ABNORMAL
SIGNIFICANT IND 70042: ABNORMAL
SITE SITE: ABNORMAL
SOURCE SOURCE: ABNORMAL

## 2022-07-27 ENCOUNTER — TELEPHONE (OUTPATIENT)
Dept: VASCULAR LAB | Facility: MEDICAL CENTER | Age: 67
End: 2022-07-27
Payer: COMMERCIAL

## 2022-07-27 NOTE — TELEPHONE ENCOUNTER
[facility] Pharmac[facility]     Received anticoag referral for Xarelto due to DVT from Dr. Lr on 7/3/22     5th call  LM to establish care     Insurance: Leelee MITCHELL  PCP: Non-[facility]  Locations to be seen: [facility] at [phone], fax [phone]     Joanne Pineda, PharmD

## 2022-08-03 ENCOUNTER — TELEPHONE (OUTPATIENT)
Dept: VASCULAR LAB | Facility: MEDICAL CENTER | Age: 67
End: 2022-08-03
Payer: COMMERCIAL

## 2022-08-03 NOTE — LETTER
Marcel Schultz  [address] [place] [address]    07/08/22    Dear Marcel Schultz ,    We have been unsuccessful in our attempts to contact you regarding your Anticoagulation Service appointments. Xarelto is a potent blood-thinning agent that requires monitoring to ensure that the dosage is correct for your body.  If it isn't, you could develop serious, sometimes life-threatening bleeding problems or life-threatening blood clots or stroke could result.    To monitor you effectively, we need to be able to communicate with you.  This is a requirement to be followed by our Service.      It is extremely important that you contact the clinic as soon as possible to arrange appropriate follow up.  We are open Monday-Friday 8 am until 5 pm.  You may reach our Service at [phone].           Sincerely,           Romaine Thomas, PharmD, [facility]  Clinic Supervisor  [facility]  Outpatient Anticoagulation Service

## 2022-08-03 NOTE — TELEPHONE ENCOUNTER
[facility]     Received anticoag referral for Xarelto due to DVT from Dr. Lr on 7/3/22     6th call  LM to establish care  Letter sent x 2     Insurance: Leelee MITCHELL  PCP: Non-[facility]  Locations to be seen: [facility] at [phone], fax [phone]    CC: Dr. Bloch